# Patient Record
Sex: MALE | Race: OTHER | Employment: UNEMPLOYED | URBAN - METROPOLITAN AREA
[De-identification: names, ages, dates, MRNs, and addresses within clinical notes are randomized per-mention and may not be internally consistent; named-entity substitution may affect disease eponyms.]

---

## 2018-05-20 ENCOUNTER — HOSPITAL ENCOUNTER (EMERGENCY)
Age: 23
Discharge: HOME | End: 2018-05-20
Attending: EMERGENCY MEDICINE
Payer: COMMERCIAL

## 2018-05-20 ENCOUNTER — APPOINTMENT (OUTPATIENT)
Dept: GENERAL RADIOLOGY | Age: 23
End: 2018-05-20
Payer: COMMERCIAL

## 2018-05-20 VITALS
WEIGHT: 140 LBS | DIASTOLIC BLOOD PRESSURE: 58 MMHG | SYSTOLIC BLOOD PRESSURE: 130 MMHG | BODY MASS INDEX: 20.04 KG/M2 | RESPIRATION RATE: 18 BRPM | HEART RATE: 77 BPM | TEMPERATURE: 98.2 F | OXYGEN SATURATION: 100 % | HEIGHT: 70 IN

## 2018-05-20 DIAGNOSIS — J02.9 ACUTE PHARYNGITIS, UNSPECIFIED ETIOLOGY: Primary | ICD-10-CM

## 2018-05-20 LAB
ANION GAP SERPL CALCULATED.3IONS-SCNC: 14 MMOL/L (ref 7–16)
BASOPHILS ABSOLUTE: 0.01 E9/L (ref 0–0.2)
BASOPHILS RELATIVE PERCENT: 0.2 % (ref 0–2)
BUN BLDV-MCNC: 14 MG/DL (ref 6–20)
CALCIUM SERPL-MCNC: 9.3 MG/DL (ref 8.6–10.2)
CHLORIDE BLD-SCNC: 98 MMOL/L (ref 98–107)
CO2: 28 MMOL/L (ref 22–29)
CREAT SERPL-MCNC: 0.8 MG/DL (ref 0.7–1.2)
EKG ATRIAL RATE: 63 BPM
EKG P AXIS: 47 DEGREES
EKG P-R INTERVAL: 142 MS
EKG Q-T INTERVAL: 404 MS
EKG QRS DURATION: 104 MS
EKG QTC CALCULATION (BAZETT): 413 MS
EKG R AXIS: 88 DEGREES
EKG T AXIS: 68 DEGREES
EKG VENTRICULAR RATE: 63 BPM
EOSINOPHILS ABSOLUTE: 0.01 E9/L (ref 0.05–0.5)
EOSINOPHILS RELATIVE PERCENT: 0.2 % (ref 0–6)
GFR AFRICAN AMERICAN: >60
GFR NON-AFRICAN AMERICAN: >60 ML/MIN/1.73
GLUCOSE BLD-MCNC: 116 MG/DL (ref 74–109)
HCT VFR BLD CALC: 42.3 % (ref 37–54)
HEMOGLOBIN: 13.4 G/DL (ref 12.5–16.5)
IMMATURE GRANULOCYTES #: 0.01 E9/L
IMMATURE GRANULOCYTES %: 0.2 % (ref 0–5)
INFLUENZA A BY PCR: NOT DETECTED
INFLUENZA B BY PCR: NOT DETECTED
LACTIC ACID: 1.5 MMOL/L (ref 0.5–2.2)
LYMPHOCYTES ABSOLUTE: 0.84 E9/L (ref 1.5–4)
LYMPHOCYTES RELATIVE PERCENT: 20.6 % (ref 20–42)
MCH RBC QN AUTO: 23.6 PG (ref 26–35)
MCHC RBC AUTO-ENTMCNC: 31.7 % (ref 32–34.5)
MCV RBC AUTO: 74.3 FL (ref 80–99.9)
MONO TEST: NEGATIVE
MONOCYTES ABSOLUTE: 0.26 E9/L (ref 0.1–0.95)
MONOCYTES RELATIVE PERCENT: 6.4 % (ref 2–12)
NEUTROPHILS ABSOLUTE: 2.94 E9/L (ref 1.8–7.3)
NEUTROPHILS RELATIVE PERCENT: 72.4 % (ref 43–80)
PDW BLD-RTO: 15.2 FL (ref 11.5–15)
PLATELET # BLD: 328 E9/L (ref 130–450)
PMV BLD AUTO: 8.9 FL (ref 7–12)
POTASSIUM SERPL-SCNC: 3.6 MMOL/L (ref 3.5–5)
RBC # BLD: 5.69 E12/L (ref 3.8–5.8)
RBC # BLD: NORMAL 10*6/UL
SODIUM BLD-SCNC: 140 MMOL/L (ref 132–146)
STREP GRP A PCR: NEGATIVE
WBC # BLD: 4.1 E9/L (ref 4.5–11.5)

## 2018-05-20 PROCEDURE — 36415 COLL VENOUS BLD VENIPUNCTURE: CPT

## 2018-05-20 PROCEDURE — 87502 INFLUENZA DNA AMP PROBE: CPT

## 2018-05-20 PROCEDURE — 86592 SYPHILIS TEST NON-TREP QUAL: CPT

## 2018-05-20 PROCEDURE — 86593 SYPHILIS TEST NON-TREP QUANT: CPT

## 2018-05-20 PROCEDURE — 86308 HETEROPHILE ANTIBODY SCREEN: CPT

## 2018-05-20 PROCEDURE — 86780 TREPONEMA PALLIDUM: CPT

## 2018-05-20 PROCEDURE — 87880 STREP A ASSAY W/OPTIC: CPT

## 2018-05-20 PROCEDURE — 83605 ASSAY OF LACTIC ACID: CPT

## 2018-05-20 PROCEDURE — 80048 BASIC METABOLIC PNL TOTAL CA: CPT

## 2018-05-20 PROCEDURE — 6370000000 HC RX 637 (ALT 250 FOR IP): Performed by: PHYSICIAN ASSISTANT

## 2018-05-20 PROCEDURE — 93005 ELECTROCARDIOGRAM TRACING: CPT | Performed by: PHYSICIAN ASSISTANT

## 2018-05-20 PROCEDURE — 71046 X-RAY EXAM CHEST 2 VIEWS: CPT

## 2018-05-20 PROCEDURE — 87040 BLOOD CULTURE FOR BACTERIA: CPT

## 2018-05-20 PROCEDURE — 99283 EMERGENCY DEPT VISIT LOW MDM: CPT

## 2018-05-20 PROCEDURE — 2580000003 HC RX 258: Performed by: PHYSICIAN ASSISTANT

## 2018-05-20 PROCEDURE — 85025 COMPLETE CBC W/AUTO DIFF WBC: CPT

## 2018-05-20 RX ORDER — ACETAMINOPHEN 500 MG
1000 TABLET ORAL ONCE
Status: COMPLETED | OUTPATIENT
Start: 2018-05-20 | End: 2018-05-20

## 2018-05-20 RX ORDER — DEXAMETHASONE 4 MG/1
4 TABLET ORAL 2 TIMES DAILY WITH MEALS
Qty: 20 TABLET | Refills: 0 | Status: SHIPPED | OUTPATIENT
Start: 2018-05-20 | End: 2018-05-30

## 2018-05-20 RX ORDER — 0.9 % SODIUM CHLORIDE 0.9 %
1000 INTRAVENOUS SOLUTION INTRAVENOUS ONCE
Status: COMPLETED | OUTPATIENT
Start: 2018-05-20 | End: 2018-05-20

## 2018-05-20 RX ADMIN — SODIUM CHLORIDE 1000 ML: 9 INJECTION, SOLUTION INTRAVENOUS at 17:35

## 2018-05-20 RX ADMIN — ACETAMINOPHEN 1000 MG: 500 TABLET ORAL at 17:36

## 2018-05-20 ASSESSMENT — PAIN SCALES - GENERAL
PAINLEVEL_OUTOF10: 0
PAINLEVEL_OUTOF10: 2

## 2018-05-20 ASSESSMENT — PAIN DESCRIPTION - LOCATION: LOCATION: THROAT

## 2018-05-20 ASSESSMENT — PAIN DESCRIPTION - PAIN TYPE: TYPE: ACUTE PAIN

## 2018-05-21 LAB
RPR TITER: NORMAL
RPR: REACTIVE

## 2018-05-23 LAB — TREPONEMA PALLIDUM ANTIBODIES: REACTIVE

## 2018-05-25 LAB
BLOOD CULTURE, ROUTINE: NORMAL
CULTURE, BLOOD 2: NORMAL

## 2018-06-29 ENCOUNTER — APPOINTMENT (OUTPATIENT)
Dept: GENERAL RADIOLOGY | Age: 23
End: 2018-06-29
Payer: COMMERCIAL

## 2018-06-29 ENCOUNTER — HOSPITAL ENCOUNTER (EMERGENCY)
Age: 23
Discharge: HOME | End: 2018-06-29
Attending: EMERGENCY MEDICINE
Payer: COMMERCIAL

## 2018-06-29 VITALS
WEIGHT: 140 LBS | SYSTOLIC BLOOD PRESSURE: 108 MMHG | RESPIRATION RATE: 16 BRPM | HEIGHT: 71 IN | DIASTOLIC BLOOD PRESSURE: 64 MMHG | TEMPERATURE: 98.1 F | HEART RATE: 76 BPM | BODY MASS INDEX: 19.6 KG/M2 | OXYGEN SATURATION: 100 %

## 2018-06-29 DIAGNOSIS — R05.9 COUGH: Primary | ICD-10-CM

## 2018-06-29 LAB
ALBUMIN SERPL-MCNC: 4.8 G/DL (ref 3.5–5.2)
ALP BLD-CCNC: 40 U/L (ref 40–129)
ALT SERPL-CCNC: 14 U/L (ref 0–40)
ANION GAP SERPL CALCULATED.3IONS-SCNC: 12 MMOL/L (ref 7–16)
AST SERPL-CCNC: 28 U/L (ref 0–39)
BACTERIA: ABNORMAL /HPF
BASOPHILS ABSOLUTE: 0.02 E9/L (ref 0–0.2)
BASOPHILS RELATIVE PERCENT: 0.7 % (ref 0–2)
BILIRUB SERPL-MCNC: 0.8 MG/DL (ref 0–1.2)
BILIRUBIN URINE: ABNORMAL
BLOOD, URINE: NEGATIVE
BUN BLDV-MCNC: 15 MG/DL (ref 6–20)
CALCIUM SERPL-MCNC: 9.6 MG/DL (ref 8.6–10.2)
CHLORIDE BLD-SCNC: 101 MMOL/L (ref 98–107)
CLARITY: CLEAR
CO2: 26 MMOL/L (ref 22–29)
COLOR: ABNORMAL
CREAT SERPL-MCNC: 1 MG/DL (ref 0.7–1.2)
EOSINOPHILS ABSOLUTE: 0.03 E9/L (ref 0.05–0.5)
EOSINOPHILS RELATIVE PERCENT: 1.1 % (ref 0–6)
GFR AFRICAN AMERICAN: >60
GFR NON-AFRICAN AMERICAN: >60 ML/MIN/1.73
GLUCOSE BLD-MCNC: 96 MG/DL (ref 74–109)
GLUCOSE URINE: NEGATIVE MG/DL
HCT VFR BLD CALC: 43.6 % (ref 37–54)
HEMOGLOBIN: 13.7 G/DL (ref 12.5–16.5)
IMMATURE GRANULOCYTES #: 0.01 E9/L
IMMATURE GRANULOCYTES %: 0.4 % (ref 0–5)
KETONES, URINE: 15 MG/DL
LEUKOCYTE ESTERASE, URINE: NEGATIVE
LYMPHOCYTES ABSOLUTE: 1.2 E9/L (ref 1.5–4)
LYMPHOCYTES RELATIVE PERCENT: 42.3 % (ref 20–42)
MCH RBC QN AUTO: 23.3 PG (ref 26–35)
MCHC RBC AUTO-ENTMCNC: 31.4 % (ref 32–34.5)
MCV RBC AUTO: 74 FL (ref 80–99.9)
MONO TEST: NEGATIVE
MONOCYTES ABSOLUTE: 0.23 E9/L (ref 0.1–0.95)
MONOCYTES RELATIVE PERCENT: 8.1 % (ref 2–12)
MUCUS: PRESENT
NEUTROPHILS ABSOLUTE: 1.35 E9/L (ref 1.8–7.3)
NEUTROPHILS RELATIVE PERCENT: 47.4 % (ref 43–80)
NITRITE, URINE: NEGATIVE
PDW BLD-RTO: 16.4 FL (ref 11.5–15)
PH UA: 5.5 (ref 5–9)
PLATELET # BLD: 281 E9/L (ref 130–450)
PMV BLD AUTO: 8.8 FL (ref 7–12)
POTASSIUM SERPL-SCNC: 3.7 MMOL/L (ref 3.5–5)
PROTEIN UA: NEGATIVE MG/DL
RBC # BLD: 5.89 E12/L (ref 3.8–5.8)
RBC UA: ABNORMAL /HPF (ref 0–2)
SODIUM BLD-SCNC: 139 MMOL/L (ref 132–146)
SPECIFIC GRAVITY UA: >=1.03 (ref 1–1.03)
STREP GRP A PCR: NEGATIVE
TOTAL PROTEIN: 6.9 G/DL (ref 6.4–8.3)
UROBILINOGEN, URINE: 0.2 E.U./DL
WBC # BLD: 2.8 E9/L (ref 4.5–11.5)
WBC UA: ABNORMAL /HPF (ref 0–5)

## 2018-06-29 PROCEDURE — 81001 URINALYSIS AUTO W/SCOPE: CPT

## 2018-06-29 PROCEDURE — 6360000002 HC RX W HCPCS: Performed by: EMERGENCY MEDICINE

## 2018-06-29 PROCEDURE — 85025 COMPLETE CBC W/AUTO DIFF WBC: CPT

## 2018-06-29 PROCEDURE — 80053 COMPREHEN METABOLIC PANEL: CPT

## 2018-06-29 PROCEDURE — 71046 X-RAY EXAM CHEST 2 VIEWS: CPT

## 2018-06-29 PROCEDURE — 86308 HETEROPHILE ANTIBODY SCREEN: CPT

## 2018-06-29 PROCEDURE — 87880 STREP A ASSAY W/OPTIC: CPT

## 2018-06-29 PROCEDURE — 96374 THER/PROPH/DIAG INJ IV PUSH: CPT

## 2018-06-29 PROCEDURE — 99283 EMERGENCY DEPT VISIT LOW MDM: CPT

## 2018-06-29 PROCEDURE — 2580000003 HC RX 258: Performed by: EMERGENCY MEDICINE

## 2018-06-29 RX ORDER — ONDANSETRON 2 MG/ML
4 INJECTION INTRAMUSCULAR; INTRAVENOUS ONCE
Status: COMPLETED | OUTPATIENT
Start: 2018-06-29 | End: 2018-06-29

## 2018-06-29 RX ORDER — DIPHENHYDRAMINE HYDROCHLORIDE 50 MG/ML
25 INJECTION INTRAMUSCULAR; INTRAVENOUS ONCE
Status: DISCONTINUED | OUTPATIENT
Start: 2018-06-29 | End: 2018-06-29 | Stop reason: HOSPADM

## 2018-06-29 RX ORDER — KETOROLAC TROMETHAMINE 30 MG/ML
15 INJECTION, SOLUTION INTRAMUSCULAR; INTRAVENOUS ONCE
Status: DISCONTINUED | OUTPATIENT
Start: 2018-06-29 | End: 2018-06-29 | Stop reason: HOSPADM

## 2018-06-29 RX ORDER — 0.9 % SODIUM CHLORIDE 0.9 %
1000 INTRAVENOUS SOLUTION INTRAVENOUS ONCE
Status: COMPLETED | OUTPATIENT
Start: 2018-06-29 | End: 2018-06-29

## 2018-06-29 RX ADMIN — ONDANSETRON 4 MG: 2 INJECTION INTRAMUSCULAR; INTRAVENOUS at 13:50

## 2018-06-29 RX ADMIN — SODIUM CHLORIDE 1000 ML: 9 INJECTION, SOLUTION INTRAVENOUS at 13:50

## 2018-06-29 ASSESSMENT — ENCOUNTER SYMPTOMS
BACK PAIN: 0
ABDOMINAL PAIN: 0
DIARRHEA: 0
EYE REDNESS: 0
BLOOD IN STOOL: 0
NAUSEA: 0
VOMITING: 0
SORE THROAT: 0
SINUS PRESSURE: 0
COUGH: 1
WHEEZING: 0
SHORTNESS OF BREATH: 1
RHINORRHEA: 0
EYE DISCHARGE: 0
EYE PAIN: 0

## 2018-06-29 ASSESSMENT — PAIN DESCRIPTION - LOCATION
LOCATION: GENERALIZED
LOCATION: HEAD

## 2018-06-29 ASSESSMENT — PAIN DESCRIPTION - ONSET: ONSET: ON-GOING

## 2018-06-29 ASSESSMENT — PAIN DESCRIPTION - PAIN TYPE
TYPE: ACUTE PAIN;CHRONIC PAIN
TYPE: ACUTE PAIN

## 2018-06-29 ASSESSMENT — PAIN SCALES - GENERAL: PAINLEVEL_OUTOF10: 8

## 2018-06-29 ASSESSMENT — PAIN DESCRIPTION - FREQUENCY: FREQUENCY: CONTINUOUS

## 2018-06-29 ASSESSMENT — PAIN DESCRIPTION - DESCRIPTORS: DESCRIPTORS: ACHING

## 2018-06-29 NOTE — ED PROVIDER NOTES
Patient is a 44-year-old male with history of HIV here with cough and shortness of breath while coughing. Patient states that his symptoms started a few days ago. He states that couple weeks ago he started a new job where he claims and ends up breathing dust. He also complains of nausea while coughing. He complains of sore throat. He denies fever, chills, sweats, chest pain/pressure, falls, injuries, lightheadedness, or abdominal pain. Patient states that he last saw his infectious disease doctor few weeks ago and plans to see him again at the beginning of August.            Review of Systems   Constitutional: Negative for chills, diaphoresis and fever. HENT: Negative for ear pain, hearing loss, nosebleeds, rhinorrhea, sinus pressure and sore throat. Eyes: Negative for pain, discharge, redness and visual disturbance. Respiratory: Positive for cough and shortness of breath. Negative for wheezing. Cardiovascular: Negative for chest pain. Gastrointestinal: Negative for abdominal pain, blood in stool, diarrhea, nausea and vomiting. Genitourinary: Negative for dysuria, flank pain, frequency and hematuria. Musculoskeletal: Negative for arthralgias and back pain. Skin: Negative for rash and wound. Neurological: Negative for syncope, weakness, light-headedness, numbness and headaches. Hematological: Negative for adenopathy. All other systems reviewed and are negative. Physical Exam   Constitutional: He is oriented to person, place, and time. He appears well-developed and well-nourished. HENT:   Head: Normocephalic and atraumatic. Head is without raccoon's eyes and without Clements's sign. Eyes: Conjunctivae and EOM are normal. Pupils are equal, round, and reactive to light. Neck: Trachea normal and normal range of motion. Neck supple. No JVD present. Cardiovascular: Normal rate, regular rhythm and normal heart sounds. Exam reveals no gallop. No murmur heard.   Pulmonary/Chest: Effort g/dL    Hematocrit 43.6 37.0 - 54.0 %    MCV 74.0 (L) 80.0 - 99.9 fL    MCH 23.3 (L) 26.0 - 35.0 pg    MCHC 31.4 (L) 32.0 - 34.5 %    RDW 16.4 (H) 11.5 - 15.0 fL    Platelets 046 535 - 415 E9/L    MPV 8.8 7.0 - 12.0 fL    Neutrophils % 47.4 43.0 - 80.0 %    Immature Granulocytes % 0.4 0.0 - 5.0 %    Lymphocytes % 42.3 (H) 20.0 - 42.0 %    Monocytes % 8.1 2.0 - 12.0 %    Eosinophils % 1.1 0.0 - 6.0 %    Basophils % 0.7 0.0 - 2.0 %    Neutrophils # 1.35 (L) 1.80 - 7.30 E9/L    Immature Granulocytes # 0.01 E9/L    Lymphocytes # 1.20 (L) 1.50 - 4.00 E9/L    Monocytes # 0.23 0.10 - 0.95 E9/L    Eosinophils # 0.03 (L) 0.05 - 0.50 E9/L    Basophils # 0.02 0.00 - 0.20 E9/L   Comprehensive Metabolic Panel   Result Value Ref Range    Sodium 139 132 - 146 mmol/L    Potassium 3.7 3.5 - 5.0 mmol/L    Chloride 101 98 - 107 mmol/L    CO2 26 22 - 29 mmol/L    Anion Gap 12 7 - 16 mmol/L    Glucose 96 74 - 109 mg/dL    BUN 15 6 - 20 mg/dL    CREATININE 1.0 0.7 - 1.2 mg/dL    GFR Non-African American >60 >=60 mL/min/1.73    GFR African American >60     Calcium 9.6 8.6 - 10.2 mg/dL    Total Protein 6.9 6.4 - 8.3 g/dL    Alb 4.8 3.5 - 5.2 g/dL    Total Bilirubin 0.8 0.0 - 1.2 mg/dL    Alkaline Phosphatase 40 40 - 129 U/L    ALT 14 0 - 40 U/L    AST 28 0 - 39 U/L   Urinalysis with Microscopic   Result Value Ref Range    Color, UA DARK YELLOW (A) Straw/Yellow    Clarity, UA Clear Clear    Glucose, Ur Negative Negative mg/dL    Bilirubin Urine SMALL (A) Negative    Ketones, Urine 15 (A) Negative mg/dL    Specific Gravity, UA >=1.030 1.005 - 1.030    Blood, Urine Negative Negative    pH, UA 5.5 5.0 - 9.0    Protein, UA Negative Negative mg/dL    Urobilinogen, Urine 0.2 <2.0 E.U./dL    Nitrite, Urine Negative Negative    Leukocyte Esterase, Urine Negative Negative    Mucus, UA Present     WBC, UA 0-1 0 - 5 /HPF    RBC, UA NONE 0 - 2 /HPF    Bacteria, UA RARE (A) /HPF   Mononucleosis Screen   Result Value Ref Range    Mono Test Negative

## 2018-07-11 ENCOUNTER — HOSPITAL ENCOUNTER (INPATIENT)
Age: 23
LOS: 2 days | Discharge: HOME | DRG: 894 | End: 2018-07-13
Attending: EMERGENCY MEDICINE | Admitting: INTERNAL MEDICINE
Payer: COMMERCIAL

## 2018-07-11 ENCOUNTER — APPOINTMENT (OUTPATIENT)
Dept: CT IMAGING | Age: 23
DRG: 894 | End: 2018-07-11
Payer: COMMERCIAL

## 2018-07-11 DIAGNOSIS — B20 HUMAN IMMUNODEFICIENCY VIRUS (HIV) DISEASE (HCC): ICD-10-CM

## 2018-07-11 DIAGNOSIS — A52.3 NEUROSYPHILIS: Primary | ICD-10-CM

## 2018-07-11 LAB
ALBUMIN SERPL-MCNC: 5 G/DL (ref 3.5–5.2)
ALP BLD-CCNC: 41 U/L (ref 40–129)
ALT SERPL-CCNC: 13 U/L (ref 0–40)
ANION GAP SERPL CALCULATED.3IONS-SCNC: 14 MMOL/L (ref 7–16)
APPEARANCE CSF: CLEAR
AST SERPL-CCNC: 22 U/L (ref 0–39)
BASOPHILS ABSOLUTE: 0.01 E9/L (ref 0–0.2)
BASOPHILS RELATIVE PERCENT: 0.4 % (ref 0–2)
BILIRUB SERPL-MCNC: 1 MG/DL (ref 0–1.2)
BILIRUBIN URINE: ABNORMAL
BLOOD, URINE: NEGATIVE
BUN BLDV-MCNC: 21 MG/DL (ref 6–20)
CALCIUM SERPL-MCNC: 9.9 MG/DL (ref 8.6–10.2)
CHLORIDE BLD-SCNC: 99 MMOL/L (ref 98–107)
CLARITY: CLEAR
CO2: 29 MMOL/L (ref 22–29)
COLOR CSF: COLORLESS
COLOR: YELLOW
CREAT SERPL-MCNC: 1 MG/DL (ref 0.7–1.2)
EOSINOPHILS ABSOLUTE: 0.03 E9/L (ref 0.05–0.5)
EOSINOPHILS RELATIVE PERCENT: 1.1 % (ref 0–6)
GFR AFRICAN AMERICAN: >60
GFR NON-AFRICAN AMERICAN: >60 ML/MIN/1.73
GLUCOSE BLD-MCNC: 82 MG/DL (ref 74–109)
GLUCOSE URINE: NEGATIVE MG/DL
GLUCOSE, CSF: 55 MG/DL (ref 40–70)
HCT VFR BLD CALC: 46.7 % (ref 37–54)
HEMOGLOBIN: 14.5 G/DL (ref 12.5–16.5)
IMMATURE GRANULOCYTES #: 0 E9/L
IMMATURE GRANULOCYTES %: 0 % (ref 0–5)
KETONES, URINE: 15 MG/DL
LACTIC ACID: 0.8 MMOL/L (ref 0.5–2.2)
LEUKOCYTE ESTERASE, URINE: NEGATIVE
LYMPHOCYTES ABSOLUTE: 1.23 E9/L (ref 1.5–4)
LYMPHOCYTES RELATIVE PERCENT: 43.9 % (ref 20–42)
MCH RBC QN AUTO: 23 PG (ref 26–35)
MCHC RBC AUTO-ENTMCNC: 31 % (ref 32–34.5)
MCV RBC AUTO: 74.1 FL (ref 80–99.9)
MONOCYTE, CSF: 100 % (ref 10–70)
MONOCYTES ABSOLUTE: 0.25 E9/L (ref 0.1–0.95)
MONOCYTES RELATIVE PERCENT: 8.9 % (ref 2–12)
NEUTROPHILS ABSOLUTE: 1.28 E9/L (ref 1.8–7.3)
NEUTROPHILS RELATIVE PERCENT: 45.7 % (ref 43–80)
NEUTROPHILS, CSF: 0 % (ref 0–10)
NITRITE, URINE: NEGATIVE
PDW BLD-RTO: 17.1 FL (ref 11.5–15)
PH UA: 5.5 (ref 5–9)
PLATELET # BLD: 289 E9/L (ref 130–450)
PMV BLD AUTO: 9 FL (ref 7–12)
POTASSIUM SERPL-SCNC: 3.9 MMOL/L (ref 3.5–5)
PROTEIN CSF: 36 MG/DL (ref 15–40)
PROTEIN UA: NEGATIVE MG/DL
RBC # BLD: 6.3 E12/L (ref 3.8–5.8)
RBC CSF: <2000 /UL
SODIUM BLD-SCNC: 142 MMOL/L (ref 132–146)
SPECIFIC GRAVITY UA: >=1.03 (ref 1–1.03)
TOTAL PROTEIN: 7.8 G/DL (ref 6.4–8.3)
TUBE NUMBER CSF: ABNORMAL
UROBILINOGEN, URINE: 0.2 E.U./DL
WBC # BLD: 2.8 E9/L (ref 4.5–11.5)
WBC CSF: 5 /UL (ref 0–2)

## 2018-07-11 PROCEDURE — 87070 CULTURE OTHR SPECIMN AEROBIC: CPT

## 2018-07-11 PROCEDURE — 86359 T CELLS TOTAL COUNT: CPT

## 2018-07-11 PROCEDURE — 80053 COMPREHEN METABOLIC PANEL: CPT

## 2018-07-11 PROCEDURE — 1200000000 HC SEMI PRIVATE

## 2018-07-11 PROCEDURE — 62270 DX LMBR SPI PNXR: CPT

## 2018-07-11 PROCEDURE — 36415 COLL VENOUS BLD VENIPUNCTURE: CPT

## 2018-07-11 PROCEDURE — 87205 SMEAR GRAM STAIN: CPT

## 2018-07-11 PROCEDURE — 87040 BLOOD CULTURE FOR BACTERIA: CPT

## 2018-07-11 PROCEDURE — 70450 CT HEAD/BRAIN W/O DYE: CPT

## 2018-07-11 PROCEDURE — 83605 ASSAY OF LACTIC ACID: CPT

## 2018-07-11 PROCEDURE — 84157 ASSAY OF PROTEIN OTHER: CPT

## 2018-07-11 PROCEDURE — 82945 GLUCOSE OTHER FLUID: CPT

## 2018-07-11 PROCEDURE — 86403 PARTICLE AGGLUT ANTBDY SCRN: CPT

## 2018-07-11 PROCEDURE — 009U3ZX DRAINAGE OF SPINAL CANAL, PERCUTANEOUS APPROACH, DIAGNOSTIC: ICD-10-PCS | Performed by: INTERNAL MEDICINE

## 2018-07-11 PROCEDURE — 86360 T CELL ABSOLUTE COUNT/RATIO: CPT

## 2018-07-11 PROCEDURE — 87536 HIV-1 QUANT&REVRSE TRNSCRPJ: CPT

## 2018-07-11 PROCEDURE — 89051 BODY FLUID CELL COUNT: CPT

## 2018-07-11 PROCEDURE — 86592 SYPHILIS TEST NON-TREP QUAL: CPT

## 2018-07-11 PROCEDURE — 85025 COMPLETE CBC W/AUTO DIFF WBC: CPT

## 2018-07-11 PROCEDURE — 87529 HSV DNA AMP PROBE: CPT

## 2018-07-11 PROCEDURE — 99285 EMERGENCY DEPT VISIT HI MDM: CPT

## 2018-07-11 RX ORDER — SODIUM CHLORIDE 0.9 % (FLUSH) 0.9 %
10 SYRINGE (ML) INJECTION PRN
Status: DISCONTINUED | OUTPATIENT
Start: 2018-07-11 | End: 2018-07-13 | Stop reason: HOSPADM

## 2018-07-11 RX ORDER — SODIUM CHLORIDE 0.9 % (FLUSH) 0.9 %
10 SYRINGE (ML) INJECTION EVERY 12 HOURS SCHEDULED
Status: DISCONTINUED | OUTPATIENT
Start: 2018-07-11 | End: 2018-07-13 | Stop reason: HOSPADM

## 2018-07-11 RX ORDER — ONDANSETRON 2 MG/ML
4 INJECTION INTRAMUSCULAR; INTRAVENOUS EVERY 6 HOURS PRN
Status: DISCONTINUED | OUTPATIENT
Start: 2018-07-11 | End: 2018-07-13 | Stop reason: HOSPADM

## 2018-07-11 RX ORDER — ACETAMINOPHEN 325 MG/1
650 TABLET ORAL EVERY 6 HOURS PRN
Status: DISCONTINUED | OUTPATIENT
Start: 2018-07-11 | End: 2018-07-13 | Stop reason: HOSPADM

## 2018-07-11 ASSESSMENT — ENCOUNTER SYMPTOMS
TINGLING: 0
NAUSEA: 0
SWOLLEN GLANDS: 0
VOMITING: 0
DIARRHEA: 0
SINUS PRESSURE: 0
SORE THROAT: 0
BACK PAIN: 1
ABDOMINAL PAIN: 1
VISUAL CHANGE: 0
BLURRED VISION: 0
COUGH: 0
PHOTOPHOBIA: 0
EYE PAIN: 0

## 2018-07-11 ASSESSMENT — PAIN SCALES - GENERAL: PAINLEVEL_OUTOF10: 0

## 2018-07-11 NOTE — ED PROVIDER NOTES
The patient is a 80-year-old male with a history of HIV, syphilis treated one month ago with doxycycline, who presents to the emergency department after sent in by his infectious disease doctor who has concerns for neurosyphilis. The patient reports that he's had a headache for the past 4 weeks on the back of his head that is constant and nonradiating. He does report that his neck hurts when he moves his head although his range of motion is intact. He also reports that he feels off balance when he is walking. He denies any numbness or tingling. He denies any skin lesions. He has no fever, chills, chest pain, shortness breath, nausea, vomiting. Headache   Pain location:  Occipital  Quality:  Dull  Radiates to:  Does not radiate  Pain severity now: moderate. Severity at highest:  Unable to specify  Onset quality:  Gradual  Duration:  4 weeks  Timing:  Constant  Progression:  Unchanged  Chronicity:  New  Similar to prior headaches: no    Context: not activity, not exposure to bright light, not coughing, not defecating, not eating, not stress, not exposure to cold air, not intercourse, not loud noise and not straining    Relieved by:  Nothing  Worsened by:  Nothing  Ineffective treatments:  None tried  Associated symptoms: abdominal pain (mild), back pain (mild), loss of balance, neck pain and neck stiffness (but range of motion is intact)    Associated symptoms: no blurred vision, no congestion, no cough, no diarrhea, no dizziness, no drainage, no ear pain, no eye pain, no facial pain, no fatigue, no fever, no focal weakness, no hearing loss, no myalgias, no nausea, no near-syncope, no numbness, no paresthesias, no photophobia, no seizures, no sinus pressure, no sore throat, no swollen glands, no syncope, no tingling, no URI, no visual change, no vomiting and no weakness        Review of Systems   Constitutional: Negative for fatigue and fever.    HENT: Negative for congestion, ear pain, hearing loss, postnasal drip, sinus pressure and sore throat. Eyes: Negative for blurred vision, photophobia and pain. Respiratory: Negative for cough. Cardiovascular: Negative for syncope and near-syncope. Gastrointestinal: Positive for abdominal pain (mild). Negative for diarrhea, nausea and vomiting. Musculoskeletal: Positive for back pain (mild), neck pain and neck stiffness (but range of motion is intact). Negative for myalgias. Neurological: Positive for headaches and loss of balance. Negative for dizziness, focal weakness, seizures, weakness, numbness and paresthesias. Physical Exam   Constitutional: He is oriented to person, place, and time. He appears well-developed and well-nourished. No distress. HENT:   Head: Normocephalic and atraumatic. Right Ear: External ear normal.   Left Ear: External ear normal.   Nose: Nose normal.   Mouth/Throat: Oropharynx is clear and moist. No oropharyngeal exudate. Eyes: Conjunctivae and EOM are normal. Pupils are equal, round, and reactive to light. Right eye exhibits no discharge. Left eye exhibits no discharge. No scleral icterus. Neck: Normal range of motion. Neck supple. No JVD present. No tracheal deviation present. No thyromegaly present. Cardiovascular: Normal rate, regular rhythm, normal heart sounds and intact distal pulses. Exam reveals no gallop and no friction rub. No murmur heard. Pulmonary/Chest: Effort normal and breath sounds normal. No stridor. No respiratory distress. He has no wheezes. He has no rales. He exhibits no tenderness. Abdominal: Soft. Bowel sounds are normal. He exhibits no distension and no mass. There is no tenderness. There is no rebound and no guarding. Musculoskeletal: Normal range of motion. He exhibits no edema, tenderness or deformity. Lymphadenopathy:     He has no cervical adenopathy. Neurological: He is alert and oriented to person, place, and time. He displays no atrophy and no tremor.  No cranial nerve deficit or sensory deficit. He exhibits normal muscle tone. He displays no seizure activity. GCS eye subscore is 4. GCS verbal subscore is 5. GCS motor subscore is 6. No meningismal signs on exam   Skin: Skin is warm and dry. No rash noted. He is not diaphoretic. No erythema. No pallor. Psychiatric: He has a normal mood and affect. His behavior is normal. Judgment and thought content normal.   Nursing note and vitals reviewed. Procedures  Lumbar Puncture Procedure Note    Indication: suspected neurosyphilis    Consent: The patient was counseled regarding the procedure, it's indications, risks, potential complications and alternatives and any questions were answered. Consent was obtained. Procedure: The patient was placed in the right lateral decubitus position and the appropriate landmarks were identified. The area was prepped and draped in the usual sterile fashion. Anesthesia was obtained using 2 cc of 1% Lidocaine without epinephrine. A spinal needle was inserted at the L3- L4 level with the stylet in place until spinal fluid was returned. Opening pressure was < 10. At this point 5.0 cc of clear cerebral spinal fluid was obtained and sent for appropriate testing. The stylet was then replaced and the needle was withdrawn. A sterile dressing was placed over the site and the patient was placed in the supine position. The patient tolerated the procedure well. Complications: None        MDM  The patient presented to the emergency department after being sent in by his infectious disease doctor for concerns for neurosyphilis. The patient was evaluated with basic labs, head CT, and then a lumbar puncture after the head CT was unremarkable. CSF fluid was obtained and sent to lab and appropriate test were ordered. The patient was alert and oriented ×3.  He did report occipital headache and neck pain although he did not have any meningismal signs on exam. The patient was admitted to medicine for further

## 2018-07-11 NOTE — H&P
[facility]  Internal Medicine Residency Program  History and Physical    Patient:  Cary Palencia 25 y.o. male MRN: [id number]     Date of Service: 7/11/2018    Hospital Day: 1      Chief complaint: HA and Neck Pain  History of Present Illness   The patient is a 25 y.o. male who came in with headaches and neck pain. He has PMHx of HIV. He was diagnosed in May with primary syphilis after he presented with genital ulcer. He has undergone two cycles of doxycyline x 14 days (pcn allergy). For the past 2 - 3 weeks, he has been experiencing headaches and neck stiffness. He denies fevers, nausea, vomiting, recent travel. He uses barrier protection. Patient recently started a new job to which he states he's around a lot of chemicals and fumes. He reports that \"all of his friends\" have been sick. Patient reports to smoking marijuana. He was seen this afternoon for ID follow up. He noted to have neck stiffness. He was sent to ED because of concern for meningitis. ED Course: VS were stable, did have neck stiffness on initial PE. LP done. Past Medical History:      Diagnosis Date    HIV (human immunodeficiency virus infection) ([facility] [address]     Meningitis     Syphilis        Past Surgical History:    History reviewed. No pertinent surgical history. Medications Prior to Admission:    Prior to Admission medications    Medication Sig Start Date End Date Taking? Authorizing Provider   Mwfpzcu-Mgmcy-Sgjeeuov-TenofAF (GENVOYA) [phone] MG TABS Take 1 tablet by mouth daily   Yes Historical Provider, MD       Allergies:  Amoxicillin    Social History:   TOBACCO:   reports that he has been smoking Cigarettes. He has been smoking about 0.50 packs per day. He has never used smokeless tobacco.  ETOH:   reports that he drinks alcohol. Family History:   History reviewed. No pertinent family history.     REVIEW OF SYSTEMS:    · Constitutional: No fever, no chills, no change in weight; good appetite  · HEENT: No blurred vision, no ear problems, sore throat, no rhinorrhea. · Respiratory: non-productive cough, slight clear sputum production, no pleuritic chest pain, no shortness of breath  · Cardiology: No angina, no dyspnea on exertion, no paroxysmal nocturnal dyspnea, no orthopnea, no palpitation, no leg swelling. · Gastroenterology: No dysphagia, no reflux; no abdominal pain, no nausea or vomiting; no constipation or diarrhea.  No hematochezia   · Genitourinary: No dysuria, no frequency, hesitancy; no hematuria  · Musculoskeletal: no joint pain, no myalgia, neck pain  · Neurology: no focal weakness in extremities, no slurred speech, no double vision, no tingling or numbness sensation  · Endocrinology: no temperature intolerance, no polyphagia, polydipsia or polyuria  · Hematology: no increased bleeding, no bruising, no lymphadenopathy  · Skin: no skin changes noticed by patient  · Psychology: no depressed mood, no suicidal ideation    Physical Exam   · Vitals: /63   Pulse 60   Temp 97.9 °F (36.6 °C) (Oral)   Resp 16   Ht 5' 10\" (1.778 m)   Wt 150 lb (68 kg)   SpO2 99%   BMI 21.52 kg/m²     · General Appearance: alert and oriented to person, place and time, well developed and well- nourished, in no acute distress  · Skin: warm and dry, no rash or erythema  · Head: normocephalic and atraumatic  · Eyes: pupils equal, round, and reactive to light, extraocular eye movements intact, conjunctivae normal  · Neck/Back: supple and non-tender without mass, no cervical lymphadenopathy Kernig's/Brudzinki negative  · Pulmonary/Chest: clear to auscultation bilaterally- no wheezes, rales or rhonchi, normal air movement, no respiratory distress  · Cardiovascular: normal rate, regular rhythm, normal S1 and S2, no murmurs, rubs, clicks, or gallops, distal pulses intact, no carotid bruits  · Abdomen: soft, non-tender, non-distended, normal bowel sounds, no masses or organomegaly  · Genitourinary/Rectal: genitals normal without hernia or inguinal adenopathy  · Extremities: no cyanosis, clubbing or edema  · Musculoskeletal: normal range of motion, no joint swelling, deformity or tenderness  · Neurologic: reflexes normal and symmetric, no cranial nerve deficit, gait, coordination and speech normal   Labs and Imaging Studies   Basic Labs  Recent Labs      18   1446   NA  142   K  3.9   CL  99   CO2  29   BUN  21*   CREATININE  1.0   GLUCOSE  82   CALCIUM  9.9       Recent Labs      18   1446   WBC  2.8*   RBC  6.30*   HGB  14.5   HCT  46.7   MCV  74.1*   MCH  23.0*   MCHC  31.0*   RDW  17.1*   PLT  289   MPV  9.0       CBC:   Lab Results   Component Value Date    WBC 2.8 2018    RBC 6.30 2018    HGB 14.5 2018    HCT 46.7 2018    MCV 74.1 2018    RDW 17.1 2018     2018     CMP:  Lab Results   Component Value Date     2018    K 3.9 2018    CL 99 2018    CO2 29 2018    BUN 21 2018    PROT 7.8 2018     Imaging Studies:     Xr Chest Standard (2 Vw)    Result Date: 2018  Patient MRN:  [id number] : 1995 Age: 25 years Gender: Male Order Date:  2018 1:30 PM EXAM: XR CHEST (2 VW) INDICATION: Cough COMPARISON: May 20, 2018 FINDINGS: The heart is normal in size. The mediastinum is normal in width. There is a normal appearance to the pulmonary vasculature. No focal airspace opacity. There is no pleural effusion. There is no pneumothorax. No airspace opacities or pleural effusion. Ct Head Wo Contrast    Result Date: 2018  Patient MRN:  [id number] : 1995 Age: 25 years Gender: Male Order Date:  2018 2:00 PM EXAM: CT HEAD WO CONTRAST INDICATION:  Headache  COMPARISON: 2017 TECHNIQUE: Axial unenhanced CT scanning was performed through the head without the use of intravenous contrast. Low-dose CT  acquisition technique included one of following options: 1 . Automated exposure control 2. Adjustment of MA and or KV according to patient's size or 3. Use of iterative reconstruction. FINDINGS: No acute intracranial hemorrhage or evidence of acute edema. No abnormal extra-axial fluid collections. Ventricles are normal in size. Basilar cisterns are patent. Visualized paranasal sinuses and mastoid air cells are clear. No depressed calvarial fractures     No evidence of acute intracranial hemorrhage, midline shift, or mass effect. Resident's Assessment and Plan     Lexy Holguin is a 25 y.o. male with a PMHx HIV and Primary syphilis came to the emergency department following a follow-up with ID for Headache and Neck Pain concerning for neurosyphilis    Primary syphilis with concern for neurosyphilis s/p LP 5/11/18   - Syphilis previously treated with Doxycycline   - LP unremarkable, f/u HSV PCR, VDRL CSF, gram stain/culture, cell count, protein, HSV   -discussed with ID. For PCN desensitization needs transfer to ICU per protocol however we do not have ICU beds at this time. - He can transfer to ICU tomorrow to begin desensitization    HIV   - Continue HAART   - HIV RNA PCR   - T+B Lymphocyte differential   - Cryptococcal AG pending    PCN Allergy   -Hives with Amoxacillin   -Pharmacy consult for PCN desensitization    PT/OT evaluation: not seen yet  DVT prophylaxis/ GI prophylaxis: Lovenox and diet  Disposition: Bria Ho MD, PGY-1  Attending physician: Dr. Hernan Tony      Senior Resident Statement  I have seen and examined the patient with the intern. I have discussed the case, including pertinent history and exam findings with the intern. I agree with the assessment, plan and orders as documented by the intern. I have also discussed the plan with the attending on call, Dr. Vee Boss    Primary syphilis with concern for meningitis  - Initial LP studies unremarkable, but will need to follow up additional tests  - Needs penicillin desensitization. Pharmacy consulted.  Per protocol, need to be in tele or ICU. We do not have ICU beds at the moment. Discussed case with Dr Elyse Muir - he can have desensitization tomorrow. He will need ICU transfer in the morning unless it can be done in tele. HIV on Genvoya  - Check if patient can bring medication to hospital by tomorrow (non-formulary)  - Obtain hepatitis panel      Remainder of medical problems as per intern note above.       Chasidy Yarbrough M.D., PGY 2    Attending physician: Dr. Katelyn Leonard

## 2018-07-12 LAB
BASOPHILS ABSOLUTE: 0.02 E9/L (ref 0–0.2)
BASOPHILS RELATIVE PERCENT: 0.9 % (ref 0–2)
BLASTS RELATIVE PERCENT: 1.7 % (ref 0–0)
CRYPTOCOCCAL ANTIGEN: NEGATIVE
EOSINOPHILS ABSOLUTE: 0.02 E9/L (ref 0.05–0.5)
EOSINOPHILS RELATIVE PERCENT: 0.9 % (ref 0–6)
FERRITIN: 82 NG/ML
FILM ARRAY ADENOVIRUS: NORMAL
FILM ARRAY BORDETELLA PERTUSSIS: NORMAL
FILM ARRAY CHLAMYDOPHILIA PNEUMONIAE: NORMAL
FILM ARRAY CORONAVIRUS 229E: NORMAL
FILM ARRAY CORONAVIRUS HKU1: NORMAL
FILM ARRAY CORONAVIRUS NL63: NORMAL
FILM ARRAY CORONAVIRUS OC43: NORMAL
FILM ARRAY INFLUENZA A VIRUS 09H1: NORMAL
FILM ARRAY INFLUENZA A VIRUS H1: NORMAL
FILM ARRAY INFLUENZA A VIRUS H3: NORMAL
FILM ARRAY INFLUENZA A VIRUS: NORMAL
FILM ARRAY INFLUENZA B: NORMAL
FILM ARRAY METAPNEUMOVIRUS: NORMAL
FILM ARRAY MYCOPLASMA PNEUMONIAE: NORMAL
FILM ARRAY PARAINFLUENZA VIRUS 1: NORMAL
FILM ARRAY PARAINFLUENZA VIRUS 2: NORMAL
FILM ARRAY PARAINFLUENZA VIRUS 3: NORMAL
FILM ARRAY PARAINFLUENZA VIRUS 4: NORMAL
FILM ARRAY RESPIRATORY SYNCITIAL VIRUS: NORMAL
FILM ARRAY RHINOVIRUS/ENTEROVIRUS: NORMAL
GRAM STAIN ORDERABLE: NORMAL
HCT VFR BLD CALC: 40 % (ref 37–54)
HEMOGLOBIN: 13 G/DL (ref 12.5–16.5)
IGA: 123 MG/DL (ref 70–400)
IGG: 896 MG/DL (ref 700–1600)
IGM: 60 MG/DL (ref 40–230)
IRON SATURATION: 28 % (ref 20–55)
IRON: 72 MCG/DL (ref 59–158)
LYMPHOCYTES ABSOLUTE: 1.27 E9/L (ref 1.5–4)
LYMPHOCYTES RELATIVE PERCENT: 48.7 % (ref 20–42)
MCH RBC QN AUTO: 23.9 PG (ref 26–35)
MCHC RBC AUTO-ENTMCNC: 32.5 % (ref 32–34.5)
MCV RBC AUTO: 73.4 FL (ref 80–99.9)
MONOCYTES ABSOLUTE: 0.21 E9/L (ref 0.1–0.95)
MONOCYTES RELATIVE PERCENT: 7.8 % (ref 2–12)
NEUTROPHILS ABSOLUTE: 1.04 E9/L (ref 1.8–7.3)
NEUTROPHILS RELATIVE PERCENT: 40 % (ref 43–80)
PDW BLD-RTO: 15.9 FL (ref 11.5–15)
PLATELET # BLD: 260 E9/L (ref 130–450)
PMV BLD AUTO: 9.2 FL (ref 7–12)
RBC # BLD: 5.45 E12/L (ref 3.8–5.8)
TOTAL IRON BINDING CAPACITY: 254 MCG/DL (ref 250–450)
WBC # BLD: 2.6 E9/L (ref 4.5–11.5)

## 2018-07-12 PROCEDURE — 6370000000 HC RX 637 (ALT 250 FOR IP): Performed by: INTERNAL MEDICINE

## 2018-07-12 PROCEDURE — 85025 COMPLETE CBC W/AUTO DIFF WBC: CPT

## 2018-07-12 PROCEDURE — 2580000003 HC RX 258: Performed by: INTERNAL MEDICINE

## 2018-07-12 PROCEDURE — 87503 INFLUENZA DNA AMP PROB ADDL: CPT

## 2018-07-12 PROCEDURE — 2580000003 HC RX 258: Performed by: STUDENT IN AN ORGANIZED HEALTH CARE EDUCATION/TRAINING PROGRAM

## 2018-07-12 PROCEDURE — 87502 INFLUENZA DNA AMP PROBE: CPT

## 2018-07-12 PROCEDURE — 36415 COLL VENOUS BLD VENIPUNCTURE: CPT

## 2018-07-12 PROCEDURE — 2000000000 HC ICU R&B

## 2018-07-12 PROCEDURE — 83540 ASSAY OF IRON: CPT

## 2018-07-12 PROCEDURE — 99223 1ST HOSP IP/OBS HIGH 75: CPT | Performed by: INTERNAL MEDICINE

## 2018-07-12 PROCEDURE — 87798 DETECT AGENT NOS DNA AMP: CPT

## 2018-07-12 PROCEDURE — 82784 ASSAY IGA/IGD/IGG/IGM EACH: CPT

## 2018-07-12 PROCEDURE — 83550 IRON BINDING TEST: CPT

## 2018-07-12 PROCEDURE — 82728 ASSAY OF FERRITIN: CPT

## 2018-07-12 PROCEDURE — 87486 CHLMYD PNEUM DNA AMP PROBE: CPT

## 2018-07-12 PROCEDURE — 6360000002 HC RX W HCPCS: Performed by: INTERNAL MEDICINE

## 2018-07-12 PROCEDURE — 87581 M.PNEUMON DNA AMP PROBE: CPT

## 2018-07-12 RX ORDER — DIPHENHYDRAMINE HYDROCHLORIDE 50 MG/ML
25 INJECTION INTRAMUSCULAR; INTRAVENOUS ONCE
Status: DISCONTINUED | OUTPATIENT
Start: 2018-07-12 | End: 2018-07-12

## 2018-07-12 RX ORDER — EPINEPHRINE 1 MG/ML
0.3 INJECTION, SOLUTION, CONCENTRATE INTRAVENOUS PRN
Status: DISCONTINUED | OUTPATIENT
Start: 2018-07-12 | End: 2018-07-13 | Stop reason: HOSPADM

## 2018-07-12 RX ADMIN — Medication 10 ML: at 21:17

## 2018-07-12 RX ADMIN — PENICILLIN V POTASSIUM: 250 POWDER, FOR SOLUTION ORAL at 19:57

## 2018-07-12 RX ADMIN — PENICILLIN V POTASSIUM: 250 POWDER, FOR SOLUTION ORAL at 20:56

## 2018-07-12 RX ADMIN — ACETAMINOPHEN 650 MG: 325 TABLET, FILM COATED ORAL at 21:08

## 2018-07-12 RX ADMIN — PENICILLIN V POTASSIUM: 250 POWDER, FOR SOLUTION ORAL at 22:27

## 2018-07-12 RX ADMIN — Medication 10 ML: at 20:45

## 2018-07-12 RX ADMIN — PENICILLIN V POTASSIUM: 250 POWDER, FOR SOLUTION ORAL at 20:12

## 2018-07-12 RX ADMIN — Medication 10 ML: at 01:11

## 2018-07-12 RX ADMIN — PENICILLIN V POTASSIUM: 250 POWDER, FOR SOLUTION ORAL at 21:12

## 2018-07-12 RX ADMIN — PENICILLIN V POTASSIUM: 250 POWDER, FOR SOLUTION ORAL at 19:43

## 2018-07-12 RX ADMIN — PENICILLIN G BENZATHINE 2.4 MILLION UNITS: 1200000 INJECTION, SUSPENSION INTRAMUSCULAR at 23:01

## 2018-07-12 RX ADMIN — PENICILLIN V POTASSIUM: 250 POWDER, FOR SOLUTION ORAL at 20:42

## 2018-07-12 RX ADMIN — PENICILLIN V POTASSIUM: 250 POWDER, FOR SOLUTION ORAL at 21:42

## 2018-07-12 RX ADMIN — PENICILLIN V POTASSIUM: 250 POWDER, FOR SOLUTION ORAL at 21:27

## 2018-07-12 RX ADMIN — PENICILLIN V POTASSIUM: 250 POWDER, FOR SOLUTION ORAL at 19:27

## 2018-07-12 RX ADMIN — Medication 10 ML: at 08:10

## 2018-07-12 RX ADMIN — PENICILLIN V POTASSIUM: 250 POWDER, FOR SOLUTION ORAL at 19:11

## 2018-07-12 RX ADMIN — PENICILLIN V POTASSIUM: 250 POWDER, FOR SOLUTION ORAL at 20:27

## 2018-07-12 RX ADMIN — PENICILLIN V POTASSIUM: 250 POWDER, FOR SOLUTION ORAL at 22:12

## 2018-07-12 RX ADMIN — PENICILLIN V POTASSIUM: 250 POWDER, FOR SOLUTION ORAL at 21:57

## 2018-07-12 ASSESSMENT — PAIN SCALES - GENERAL
PAINLEVEL_OUTOF10: 0
PAINLEVEL_OUTOF10: 4
PAINLEVEL_OUTOF10: 0

## 2018-07-12 ASSESSMENT — PAIN DESCRIPTION - ORIENTATION: ORIENTATION: RIGHT;LEFT;MID

## 2018-07-12 ASSESSMENT — PAIN DESCRIPTION - PAIN TYPE: TYPE: ACUTE PAIN

## 2018-07-12 ASSESSMENT — PAIN DESCRIPTION - FREQUENCY: FREQUENCY: INTERMITTENT

## 2018-07-12 ASSESSMENT — PAIN DESCRIPTION - LOCATION: LOCATION: HEAD

## 2018-07-12 ASSESSMENT — PAIN DESCRIPTION - ONSET: ONSET: ON-GOING

## 2018-07-12 ASSESSMENT — PAIN DESCRIPTION - PROGRESSION: CLINICAL_PROGRESSION: NOT CHANGED

## 2018-07-12 ASSESSMENT — PAIN DESCRIPTION - DESCRIPTORS: DESCRIPTORS: ACHING;DISCOMFORT;SORE;HEADACHE

## 2018-07-12 NOTE — CONSULTS
[facility] in the last 72 hours. No results for input(s): LABURIN in the last 72 hours. No results for input(s): CULTRESP in the last 72 hours. No results for input(s): WNDABS in the last 72 hours. Radiology :  CT Head WO Contrast   Final Result   No evidence of acute intracranial hemorrhage, midline shift, or mass   effect. Assessment and Plan:      .1 Primary syphilis  - t/april with 14 days of PO doxycyline (amox allergy causing rash)  - RPR on 5/29- 1:64     2. Suspected Neurosyphilis- rule out  Now in secondary syphilis stage with rash/adenopathy/headache    3 HIV controlled On Genvoya( Elvitegravir/TAF/C/emtricitabine)  - adherent to his meds, his last visit was in nov 2017 with Dr Rhea Last andhis  most recent CD4 count was 414 and HIV viral load < 20 as of Feb 2017. RPR, T spot TB test - negative  - H/O HSV meningitis in 2016  4 Leukopenic     Plan    - LP, low opening pressure, CSF exam does not show pleocytosis and high proetin, essentially neg for neurosyphils. VDRL CSF testing and HSV PCR  in process but overall CSF not impressive for either of the disease process.   - will need penicillin de-sensitisation  - Give one dose of IM Benzathine penicillin 1.2 mU   - check RPR again peripheral blood and then F/U in 3 months  - CD4/VL ordered  - re-start Genvoya(can get home supply)  - Ok for D/C after de-sensitisation and getting One dose MINDA Denise MD

## 2018-07-12 NOTE — PROGRESS NOTES
[facility]  Internal Medicine Department  Division of Pulmonary, Critical Care and Sleep Medicine  Daily Intensive Care Unit Progress  Note      Admit Date: 7/11/2018    MRN: [id number]        Patient:  David Hsu 25 y.o. male     PCP: No primary care provider on file. Date of Service: 7/12/2018      Allergy: Amoxicillin      Subjective   The events of the past 24 hours are in the residents notes. He was diagnosed in May with primary syphilis after he presented with genital ulcer. He has undergone two cycles of doxycyline x 14 days (pcn allergy). For the past 2 - 3 weeks, he has been experiencing headaches and neck stiffness. He denies fevers, nausea, vomiting, recent travel. He uses barrier protection. Patient recently started a new job to which he states he's around a lot of chemicals and fumes. He reports that \"all of his friends\" have been sick. Patient reports to smoking marijuana.     He was seen this afternoon for ID follow up. He noted to have neck stiffness. He was sent to ED because of concern for meningitis. Physical Exam:   VITALS:  /65   Pulse 64   Temp 98.5 °F (36.9 °C) (Temporal)   Resp 12   Ht 5' 10\" (1.778 m)   Wt 150 lb (68 kg)   SpO2 99%   BMI 21.52 kg/m²   24HR INTAKE/OUTPUT:  No intake or output data in the 24 hours ending 07/12/18 1751  General: Alert  HEENT: Conjunctiva/corneas clear, No icterus. No exudates. Neck: Supple, No JVD  Chest wall: Symmetric excursion  Lung: Course to auscultation No rales or wheezing  Heart: Regular rate and rhythm, No murmur, rub or  gallop. Abdomen: BS +, soft, no rigidity, rebound or guarding  Extremities: Trace edema. Palp pulses. Skin: No rashes  Musculokeletal: No trauma signs   Lymph nodes: No adenopathy  Neurologic: Non focal examination    Medications   Home and Current medications were discussed & reviewed on rounds with team and pharmacy liaison.     Labs &  Imaging Studies   The data collected below

## 2018-07-12 NOTE — PROGRESS NOTES
[address]   Internal Medicine Residency / [address]    Attending Physician Statement  I have discussed the case, including pertinent history and exam findings with the resident and the team.  I have seen and examined the patient and the key elements of the encounter have been performed by me. I agree with the assessment, plan and orders as documented by the resident. A&O  No nuchal rigidity  No headache today  Appeared to be musculoskeletal  Doubt PCN anaphylactic allergy and   Seems to have had EB induced Ampicillin rash  He may have latent or secondary syphilis   CD4 is>400 on  AIDS meds   LP negative  Plan: Rocephin and desensitization per ID plus Benadryl     Remainder of medical problems as per resident note.       Mikayla Roberson  Internal Medicine Residency Faculty

## 2018-07-12 NOTE — PROGRESS NOTES
Nat Watson was ordered elvitegravir-cobicistat-emtricitabine-tenofovir Gilbert Perez) which is a nonformulary medication. The patient has indicated that the home supply of this medication will be brought in to the hospital for inpatient use. If the medication has not been administered by 1400 on the following day from the time the order was placed, a pharmacist will follow-up with the nurse of the patient to assess the capability of the patient to bring in the medication. If it is determined that the patient cannot supply the medication and it is not available to be dispensed from the pharmacy, a call will be placed to the ordering provider to discuss alternative options.      Michelle Cameron, PharmD  07/11/18 9:19 PM

## 2018-07-12 NOTE — PLAN OF CARE
Problem: DAILY CARE  Goal: Daily care needs are met  Outcome: Met This Shift      Problem: PAIN  Goal: Patient's pain/discomfort is manageable  Outcome: Met This Shift

## 2018-07-12 NOTE — PROGRESS NOTES
[facility]  [facility] Res[facility] Program  History and Physical    Patient:  Sharlett Fabry 25 y.o. male MRN: [id number]     Date of Service: 7/12/2018    Hospital Day: 2      Chief complaint: HA and Neck Pain    Subjective     Overnight events: no    Relevant reports or input from nurses/consults/labs:  - The patient's relatives will bring med this PM about 4-6 pm    Current status description: the patient was seen and examined. The patient denies any active symptoms, no headache, no nausea, vomiting, no blurry vision, normal mobility. No change in bowel movements and urination. Ambulation: normal     Diet: general     Objective   · Vitals: BP (!) 119/57   Pulse 71   Temp 98.7 °F (37.1 °C) (Temporal)   Resp 16   Ht 5' 10\" (1.778 m)   Wt 150 lb (68 kg)   SpO2 97%   BMI 21.52 kg/m²     · General Appearance: alert and oriented to person, place and time, well developed and well- nourished, in no acute distress  · Skin: warm and dry, no rash or erythema  · Head: normocephalic and atraumatic  · Eyes: pupils equal, round, and reactive to light, extraocular eye movements intact, conjunctivae normal, Karjohnnienn Romero (-).    · Neck/Back: supple and non-tender without mass, no cervical lymphadenopathy Kernig's/Brudzinki negative  · Pulmonary/Chest: clear to auscultation bilaterally- no wheezes, rales or rhonchi, normal air movement, no respiratory distress  · Cardiovascular: normal rate, regular rhythm, normal S1 and S2, no murmurs, rubs, clicks, or gallops, distal pulses intact, no carotid bruits  · Abdomen: soft, non-tender, non-distended, normal bowel sounds, no masses or organomegaly  · Musculoskeletal: normal range of motion, no joint swelling, deformity or tenderness  · Neurologic: reflexes normal and symmetric, no cranial nerve deficit, gait, coordination and speech normal. Epifanio (-)   Labs and Imaging Studies   Basic Labs  Recent Labs      07/11/18   1446   NA  142   K  3.9   CL

## 2018-07-12 NOTE — PLAN OF CARE
Discussed case with Dr. Katie Morales. Patients needs transferred to ICU for penicillin desensitization. He has been accepted, unfortunately, currently without ICU bed available. Give one dose of IM Benzathine penicillin 1.2 mU after desensitization. ID and Pharmacy updated about plans.       Electronically signed by Sarah Kaba MD on 7/12/2018 at 8:54 AM

## 2018-07-12 NOTE — CARE COORDINATION
Met with patient to explain role. He is aware he  That will be transferring to another unit for penicillin de-sensitisation. He lives alone and no discharge needs identified at this time.

## 2018-07-13 VITALS
BODY MASS INDEX: 21.47 KG/M2 | SYSTOLIC BLOOD PRESSURE: 110 MMHG | HEART RATE: 45 BPM | WEIGHT: 150 LBS | HEIGHT: 70 IN | RESPIRATION RATE: 16 BRPM | TEMPERATURE: 98 F | DIASTOLIC BLOOD PRESSURE: 67 MMHG | OXYGEN SATURATION: 98 %

## 2018-07-13 LAB
ABSOLUTE CD 3: 1029 CELLS/UL (ref 570–2400)
ABSOLUTE CD 4 HELPER: 485 CELLS/UL (ref 430–1800)
ABSOLUTE CD 8 (SUPP): 481 CELLS/UL (ref 210–1200)
CD4/CD8 RATIO: 1.01 RATIO (ref 0.8–3.9)
LYMPH SUBSET INFORMATION: NORMAL
VDRL CSF SCREEN: NON REACTIVE

## 2018-07-14 LAB
HERPES SIMPLEX VIRUS BY PCR: NOT DETECTED
HSV SOURCE: NORMAL

## 2018-07-16 LAB
BLOOD CULTURE, ROUTINE: NORMAL
CSF CULTURE: NORMAL
CULTURE, BLOOD 2: NORMAL
GRAM STAIN RESULT: NORMAL

## 2018-07-17 LAB
DIRECT EXAM: NORMAL
SPECIMEN: NORMAL

## 2018-11-20 ENCOUNTER — HOSPITAL ENCOUNTER (EMERGENCY)
Age: 23
Discharge: HOME | End: 2018-11-20
Payer: COMMERCIAL

## 2018-11-20 ENCOUNTER — APPOINTMENT (OUTPATIENT)
Dept: GENERAL RADIOLOGY | Age: 23
End: 2018-11-20
Payer: COMMERCIAL

## 2018-11-20 VITALS
HEIGHT: 70 IN | TEMPERATURE: 97.9 F | BODY MASS INDEX: 20.76 KG/M2 | OXYGEN SATURATION: 98 % | WEIGHT: 145 LBS | RESPIRATION RATE: 16 BRPM | SYSTOLIC BLOOD PRESSURE: 154 MMHG | DIASTOLIC BLOOD PRESSURE: 87 MMHG | HEART RATE: 77 BPM

## 2018-11-20 DIAGNOSIS — J01.00 ACUTE MAXILLARY SINUSITIS, RECURRENCE NOT SPECIFIED: ICD-10-CM

## 2018-11-20 DIAGNOSIS — J06.9 UPPER RESPIRATORY TRACT INFECTION, UNSPECIFIED TYPE: Primary | ICD-10-CM

## 2018-11-20 PROCEDURE — 99283 EMERGENCY DEPT VISIT LOW MDM: CPT

## 2018-11-20 PROCEDURE — 71046 X-RAY EXAM CHEST 2 VIEWS: CPT

## 2018-11-20 RX ORDER — AZITHROMYCIN 250 MG/1
TABLET, FILM COATED ORAL
Qty: 1 PACKET | Refills: 0 | Status: SHIPPED | OUTPATIENT
Start: 2018-11-20 | End: 2018-11-30

## 2018-11-20 NOTE — ED NOTES
Patient discharged with belongings. Discussed care instructions, follow-up instructions, medications and when to return to the hospital. Patient verbalizes understanding and has no further questions at this time. Electronically signed by Carmita Sellers.  LUISA Cano on 11/20/2018 at [address] [place] [address] LUISA Sage  11/20/18 0989